# Patient Record
Sex: FEMALE | Race: WHITE | NOT HISPANIC OR LATINO | Employment: OTHER | URBAN - METROPOLITAN AREA
[De-identification: names, ages, dates, MRNs, and addresses within clinical notes are randomized per-mention and may not be internally consistent; named-entity substitution may affect disease eponyms.]

---

## 2022-09-13 ENCOUNTER — OFFICE VISIT (OUTPATIENT)
Dept: PODIATRY | Facility: CLINIC | Age: 72
End: 2022-09-13
Payer: MEDICARE

## 2022-09-13 VITALS
SYSTOLIC BLOOD PRESSURE: 136 MMHG | HEIGHT: 67 IN | HEART RATE: 69 BPM | WEIGHT: 226.81 LBS | DIASTOLIC BLOOD PRESSURE: 86 MMHG | BODY MASS INDEX: 35.6 KG/M2 | RESPIRATION RATE: 18 BRPM

## 2022-09-13 DIAGNOSIS — M79.89 PAIN AND SWELLING OF TOE, LEFT: Primary | ICD-10-CM

## 2022-09-13 DIAGNOSIS — M79.675 PAIN AND SWELLING OF TOE, LEFT: Primary | ICD-10-CM

## 2022-09-13 DIAGNOSIS — I87.2 VENOUS INSUFFICIENCY OF BOTH LOWER EXTREMITIES: ICD-10-CM

## 2022-09-13 DIAGNOSIS — L84 FOOT CALLUS: ICD-10-CM

## 2022-09-13 DIAGNOSIS — M20.5X2 ACQUIRED ADDUCTOVARUS ROTATION OF TOE OF LEFT FOOT: ICD-10-CM

## 2022-09-13 PROCEDURE — 3288F PR FALLS RISK ASSESSMENT DOCUMENTED: ICD-10-PCS | Mod: CPTII,S$GLB,, | Performed by: PODIATRIST

## 2022-09-13 PROCEDURE — 3008F BODY MASS INDEX DOCD: CPT | Mod: CPTII,S$GLB,, | Performed by: PODIATRIST

## 2022-09-13 PROCEDURE — 1159F PR MEDICATION LIST DOCUMENTED IN MEDICAL RECORD: ICD-10-PCS | Mod: CPTII,S$GLB,, | Performed by: PODIATRIST

## 2022-09-13 PROCEDURE — 1159F MED LIST DOCD IN RCRD: CPT | Mod: CPTII,S$GLB,, | Performed by: PODIATRIST

## 2022-09-13 PROCEDURE — 99203 OFFICE O/P NEW LOW 30 MIN: CPT | Mod: S$GLB,,, | Performed by: PODIATRIST

## 2022-09-13 PROCEDURE — 1126F PR PAIN SEVERITY QUANTIFIED, NO PAIN PRESENT: ICD-10-PCS | Mod: CPTII,S$GLB,, | Performed by: PODIATRIST

## 2022-09-13 PROCEDURE — 99999 PR PBB SHADOW E&M-NEW PATIENT-LVL III: ICD-10-PCS | Mod: PBBFAC,,, | Performed by: PODIATRIST

## 2022-09-13 PROCEDURE — 3075F PR MOST RECENT SYSTOLIC BLOOD PRESS GE 130-139MM HG: ICD-10-PCS | Mod: CPTII,S$GLB,, | Performed by: PODIATRIST

## 2022-09-13 PROCEDURE — 1101F PR PT FALLS ASSESS DOC 0-1 FALLS W/OUT INJ PAST YR: ICD-10-PCS | Mod: CPTII,S$GLB,, | Performed by: PODIATRIST

## 2022-09-13 PROCEDURE — 1101F PT FALLS ASSESS-DOCD LE1/YR: CPT | Mod: CPTII,S$GLB,, | Performed by: PODIATRIST

## 2022-09-13 PROCEDURE — 3075F SYST BP GE 130 - 139MM HG: CPT | Mod: CPTII,S$GLB,, | Performed by: PODIATRIST

## 2022-09-13 PROCEDURE — 99203 PR OFFICE/OUTPT VISIT, NEW, LEVL III, 30-44 MIN: ICD-10-PCS | Mod: S$GLB,,, | Performed by: PODIATRIST

## 2022-09-13 PROCEDURE — 3079F DIAST BP 80-89 MM HG: CPT | Mod: CPTII,S$GLB,, | Performed by: PODIATRIST

## 2022-09-13 PROCEDURE — 3288F FALL RISK ASSESSMENT DOCD: CPT | Mod: CPTII,S$GLB,, | Performed by: PODIATRIST

## 2022-09-13 PROCEDURE — 3008F PR BODY MASS INDEX (BMI) DOCUMENTED: ICD-10-PCS | Mod: CPTII,S$GLB,, | Performed by: PODIATRIST

## 2022-09-13 PROCEDURE — 99999 PR PBB SHADOW E&M-NEW PATIENT-LVL III: CPT | Mod: PBBFAC,,, | Performed by: PODIATRIST

## 2022-09-13 PROCEDURE — 1126F AMNT PAIN NOTED NONE PRSNT: CPT | Mod: CPTII,S$GLB,, | Performed by: PODIATRIST

## 2022-09-13 PROCEDURE — 3079F PR MOST RECENT DIASTOLIC BLOOD PRESSURE 80-89 MM HG: ICD-10-PCS | Mod: CPTII,S$GLB,, | Performed by: PODIATRIST

## 2022-09-13 RX ORDER — DICLOFENAC SODIUM 10 MG/G
2 GEL TOPICAL 3 TIMES DAILY
Qty: 600 G | Refills: 0 | Status: SHIPPED | OUTPATIENT
Start: 2022-09-13 | End: 2022-12-12

## 2022-09-13 RX ORDER — METOPROLOL SUCCINATE 100 MG/1
TABLET, EXTENDED RELEASE ORAL
COMMUNITY
Start: 2021-12-14

## 2022-09-13 RX ORDER — ESTRADIOL 1 MG/1
1 TABLET ORAL
COMMUNITY
Start: 2021-12-07

## 2022-09-14 NOTE — PROGRESS NOTES
"Subjective:       Patient ID: Fauzia Polo is a 72 y.o. female.    Chief Complaint: Callouses  Patient presents with complaint severe pain 5th digit left foot with pressure and upon walking.  Less pain nonweightbearing but has had some radiating pain from this toe.  Started after she got her feet wet in her Crocs, pain has progressed since then.  Relates problems with swelling in her feet legs.      Past Medical History:   Diagnosis Date    Hypertension     Venous insufficiency      Past Surgical History:   Procedure Laterality Date    HYSTERECTOMY      TONSILLECTOMY       Family History   Problem Relation Age of Onset    Heart disease Mother     No Known Problems Father      Social History     Socioeconomic History    Marital status: Unknown   Tobacco Use    Smoking status: Never    Smokeless tobacco: Never   Substance and Sexual Activity    Alcohol use: Not Currently    Drug use: Not Currently       Current Outpatient Medications   Medication Sig Dispense Refill    estradioL (ESTRACE) 1 MG tablet 1 mg.      metoprolol succinate (TOPROL-XL) 100 MG 24 hr tablet   See Instructions, TAKE 1 TABLET TWICE DAILY, # 180 tab, 3 Refill(s), Pharmacy: [facility], 168, cm, 12/07/21 16:01:00 CST, Height/Length Measured, 101.5, kg, 12/07/21 16:01:00 CST, Weight Dosing      diclofenac sodium (VOLTAREN) 1 % Gel Apply 2 g topically 3 (three) times daily. 600 g 0     No current facility-administered medications for this visit.     Review of patient's allergies indicates:  No Known Allergies    Review of Systems   HENT:  Negative for congestion.    Respiratory:  Negative for cough.    Cardiovascular:  Positive for leg swelling.   All other systems reviewed and are negative.    Objective:      Vitals:    09/13/22 0916   BP: 136/86   Pulse: 69   Resp: 18   Weight: 102.9 kg (226 lb 12.8 oz)   Height: 5' 7" (1.702 m)     Physical Exam  Vitals and nursing note reviewed.   Constitutional:       General: She is not in acute " distress.  Cardiovascular:      Pulses:           Dorsalis pedis pulses are 2+ on the right side and 2+ on the left side.        Posterior tibial pulses are 2+ on the right side and 2+ on the left side.   Pulmonary:      Effort: Pulmonary effort is normal.   Musculoskeletal:         General: Swelling and tenderness present.      Right foot: Deformity present.      Left foot: Deformity (Adductovarus deformity 5th with mild tailor's bunion left greater than right) present.      Comments: Pain, swelling 5th toe left with subtle neuritis   Feet:      Right foot:      Skin integrity: Callus present.      Left foot:      Skin integrity: Callus (Painful pre ulcerative callus lesion distal lateral left 5th digit, no skin opening.  Very small similar lesion right 5th digit with no pain) present.   Skin:     Capillary Refill: Capillary refill takes 2 to 3 seconds.   Neurological:      General: No focal deficit present.   Psychiatric:         Mood and Affect: Mood normal.         Behavior: Behavior normal.                            Assessment:       1. Pain and swelling of toe, left    2. Acquired adductovarus rotation of toe of left foot    3. Foot callus    4. Venous insufficiency of both lower extremities        Plan:           DICLOFENAC GEL 1% APPLY 3 TIMES DAILY 5TH DIGIT LEFT FOOT    Reviewed swelling 5th digit left foot most likely due to repeated friction and moisture when this area became wet well wearing Crocs.  Advised patient acute swelling of the toe causes extreme pain when pressure is placed on the adjacent nerve.  We discussed neuritis and inflammation of the nerve causing high level of pain at times.  We reviewed small callus lesion which is pre ulcerative the left 5th digit at risk for developing an open sore and infection  Area was debrided with no opening at this time.  Same area debrided right foot and advised patient these areas are at high risk for recurrence due to rotation of the toe and pressure in  this location.  We reviewed wide appropriate shoes, light weight tennis shoe with thick sole for shock absorption, large toe box.  Val Leal  Instructed patient on ice/cool therapy which can be extremely effective to reduce swelling and inflammation of the 5th digit, start with cool water, at ice cubes as tolerated, apply or soak 3-6 times daily as long as well tolerated and beneficial.  Additionally discussed use of diclofenac gel, however this needs to be thoroughly absorbed into the skin and dry before applying socks and shoes  Once pain-free we discussed soaking warm water and Epson salt in use of tea tree oil to try to prevent recurrence of calluses  He also had a low lengthy discussion regarding venous insufficiency, potential complications and discussed alternatives to medical grade compression hose.  Encouraged patient to consider trying sports socks, any long sock with light to moderate compression to the knee daily in tennis shoes  Patient was in understanding and agreement with treatment plan.  I counseled the patient on their conditions, implications and medical management.  Instructed patient/family to contact the office with any changes, questions, concerns, worsening of symptoms.   Total face to face time, exam, assessment, treatment, discussion, documentation 30 minutes, more than half this time spent on consultation and coordination of care.   Follow up as needed, if not pain-free in 2 weeks    This note was created using [employer] voice recognition software that occasionally misinterpreted phrases or words.    Patient was in understanding and agreement with treatment plan.  I counseled the patient on their conditions, implications and medical management.  Instructed patient/family to contact the office with any changes, questions, concerns, worsening of symptoms.   Total face to face time, exam, assessment, treatment, discussion, documentation 20 minutes, more than half this time spent on  consultation and coordination of care.   Follow up    This note was created using [employer] voice recognition software that occasionally misinterpreted phrases or words.